# Patient Record
(demographics unavailable — no encounter records)

---

## 2024-11-04 NOTE — HISTORY OF PRESENT ILLNESS
[FreeTextEntry1] : Ms. Leon is a 55 year-old woman presenting for follow-up of primary hyperparathyroidism and menopausal syndrome. I saw her for an initial visit in February 2023 and last in April 2024.  Primary hyperparathyroidism. She has a history of hypercalcemia due to primary hyperparathyroidism and is status post parathyroidectomy in 2011 with Dr. Fantasma Yadav.  She has had subsequent hyperparathyroidism with serum calcium values at the upper end of normal.  Thyroid ultrasound from March 2023 demonstrated a right subcentimeter nodule not meeting [employer] Thyroid Imaging Reporting and Data Systems (TIRADS) criteria for biopsy or monitoring. No history of kidney stone. 24 hour urine calcium within range and renal ultrasound without nephrolithiasis in January 2023. She has a history of left elbow fracture around 2018 in a fall on ice. Bone density testing in November 2022 significant for T-scores of -1.0 at the lumbar spine, -1.4 at the femoral neck, -0.9 at the total hip and +0.6 at the distal radius. No history of radiation exposure. No history of hydrochlorothiazide or lithium use. She has 1/4 cup milk daily with coffee and occasional cheese or yogurt. She is taking Centrum Silver for Women 50+ (calcium 300 mg, vitamin D 1000 intl units) every other day. She is taking vitamin D 4000 intl units daily. Genetic testing in February 2023 negative for the parathyroid panel (AP2S1, CASR, CDC73, CDKN1B, GNA11, MEN1, RET).  Mother with possible recent history of hyperparathyroidism. No family history of endocrine tumors. No parental history of hip fracture.   Menopausal syndrome.  She has a history of surgical menopause from a hysterectomy in March 2019. She was started on oral estradiol 0.5 mg daily postoperatively with continued hot flashes; she had been titrated to 1 mg daily with improvement in symptoms. In February 2023 we changed oral estradiol to transdermal estradiol 0.05 mg twice weekly. She is currently taking transdermal estradiol 0.05 mg twice weekly.  No personal history of thromboembolism, breast cancer, or heart disease. No family history of breast cancer or premature heart disease.  Interim History  She is currently taking transdermal 0.05 mg twice weekly. Her symptoms of menopause have been under good control other than some mood disturbance.  Laboratory results from last visit as below. PTH elevated with serum calcium at the upper limit of normal as per previous. HbA1c, lipid panel, TSH, and other test results within range.  She has not been able to have bone density testing as discussed last visit.  She has had some right calf pain with negative evaluation for thrombosis. No other acute issues. Medical and surgical history, medications, allergies, social and family history reviewed and updated as needed.

## 2024-11-04 NOTE — ASSESSMENT
[FreeTextEntry1] : Primary hyperparathyroidism. She has a history of hypercalcemia due to primary hyperparathyroidism and is status post parathyroidectomy in 2011 with Dr. Fantasma Yadav. She has had subsequent hyperparathyroidism with serum calcium values at the upper end of normal. Genetic testing in February 2023 negative for the parathyroid panel (AP2S1, CASR, CDC73, CDKN1B, GNA11, MEN1, RET). We have discussed the complications of primary hyperparathyroidism, including but not limited to hypercalcemia, nephrolithiasis, and osteoporosis. We have discussed the recommendations for calcium and vitamin D intake; there is no indication to restrict calcium intake in patients with primary hyperparathyroidism. We can consider an additional parathyroid surgery if serum calcium rises above the upper limit of normal.  Monitor calciotropic panel Interval bone density testing Calcium 1000 mg daily from diet and supplements (to be taken in divided doses as no more than 500-600 mg can be absorbed at one time) Continue current vitamin D regimen pending level  Menopausal syndrome. We have discussed the risks and benefits of hormone replacement therapy, including but not limited to breast cancer, thromboembolic disease, heart attack, and stroke. She is tolerating her current regimen and we will continue. Continue transdermal estradiol 0.05 mg twice weekly Annual mammogram testing through gynecology; I referred her to gynecology providers within the [facility] system  Thyroid nodules. She has been biochemically euthyroid. Thyroid ultrasound from March 2023 demonstrated a right subcentimeter nodule not meeting criteria for biopsy or monitoring.   Calf pain. I referred her to sports medicine providers within the [facility] system.   Return to see me in 6 months or earlier as needed.

## 2024-11-04 NOTE — DATA REVIEWED
[FreeTextEntry1] : Thyroid ultrasound (March 10, 2023) reviewed and significant for: ISTHMUS: Normal size in AP dimension measuring 0.2 cm.  RIGHT LOBE:  Measures 4.6 x 1.9 x 1.4 cm in sagittal x AP x transverse dimensions.  Volume 6.4 cc. ARCHITECTURE: Slightly heterogeneous. There is a 0.6 x 0.3 x 0.3 cm well-circumscribed solid hypoechoic nodule in the midpole. COLOR DOPPLER:  Unremarkable.   LEFT LOBE:    Measures 3.4 x 1.1 x 1.6 cm in sagittal x AP x transverse dimensions. Volume 3.2 cc. ARCHITECTURE: Slightly heterogeneous without a discrete nodule. COLOR DOPPLER:  Unremarkable.   IMPRESSION: Subcentimeter hypoechoic nodule in the right midpole.

## 2024-11-04 NOTE — ADDENDUM
[FreeTextEntry1] : Recent laboratory results as below. Serum calcium at the upper end of normal with an elevated PTH concentration, consistent with known primary hyperparathyroidism. Glucose not fasting. TSH and other test results within range. I left a telephone message for Ms. Leon to discuss. 11/04/24

## 2024-11-04 NOTE — PHYSICAL EXAM
[Alert] : alert [Healthy Appearance] : healthy appearance [No Acute Distress] : no acute distress [Normal Sclera/Conjunctiva] : normal sclera/conjunctiva [Normal Hearing] : hearing was normal [Well Healed Scar] : well healed scar [No Respiratory Distress] : no respiratory distress [No Stigmata of Cushings Syndrome] : no stigmata of Cushings Syndrome [Normal Gait] : normal gait [Normal Insight/Judgement] : insight and judgment were intact [Kyphosis] : no kyphosis present [Acanthosis Nigricans] : no acanthosis nigricans [de-identified] : no moon facies, no supraclavicular fat pads

## 2024-11-04 NOTE — PHYSICAL EXAM
[Alert] : alert [Healthy Appearance] : healthy appearance [No Acute Distress] : no acute distress [Normal Sclera/Conjunctiva] : normal sclera/conjunctiva [Normal Hearing] : hearing was normal [Well Healed Scar] : well healed scar [No Respiratory Distress] : no respiratory distress [No Stigmata of Cushings Syndrome] : no stigmata of Cushings Syndrome [Normal Gait] : normal gait [Normal Insight/Judgement] : insight and judgment were intact [Kyphosis] : no kyphosis present [Acanthosis Nigricans] : no acanthosis nigricans [de-identified] : no moon facies, no supraclavicular fat pads

## 2024-11-04 NOTE — ASSESSMENT
[FreeTextEntry1] : Primary hyperparathyroidism. She has a history of hypercalcemia due to primary hyperparathyroidism and is status post parathyroidectomy in 2011 with Dr. Fantasma Yadav. She has had subsequent hyperparathyroidism with serum calcium values at the upper end of normal. Genetic testing in February 2023 negative for the parathyroid panel (AP2S1, CASR, CDC73, CDKN1B, GNA11, MEN1, RET). We have discussed the complications of primary hyperparathyroidism, including but not limited to hypercalcemia, nephrolithiasis, and osteoporosis. We have discussed the recommendations for calcium and vitamin D intake; there is no indication to restrict calcium intake in patients with primary hyperparathyroidism. We can consider an additional parathyroid surgery if serum calcium rises above the upper limit of normal.  Monitor calciotropic panel Interval bone density testing Calcium 1000 mg daily from diet and supplements (to be taken in divided doses as no more than 500-600 mg can be absorbed at one time) Continue current vitamin D regimen pending level  Menopausal syndrome. We have discussed the risks and benefits of hormone replacement therapy, including but not limited to breast cancer, thromboembolic disease, heart attack, and stroke. She is tolerating her current regimen and we will continue. Continue transdermal estradiol 0.05 mg twice weekly Annual mammogram testing through gynecology; I referred her to gynecology providers within the [facility] system  Thyroid nodules. She has been biochemically euthyroid. Thyroid ultrasound from March 2023 demonstrated a right subcentimeter nodule not meeting criteria for biopsy or monitoring.   Calf pain. I referred her to sports medicine providers within the [facility] system.   Return to see me in 6 months or earlier as needed.

## 2024-11-04 NOTE — HISTORY OF PRESENT ILLNESS
[FreeTextEntry1] : Ms. Leon is a 55 year-old woman presenting for follow-up of primary hyperparathyroidism and menopausal syndrome. I saw her for an initial visit in February 2023 and last in April 2024.  Primary hyperparathyroidism. She has a history of hypercalcemia due to primary hyperparathyroidism and is status post parathyroidectomy in 2011 with Dr. Fantasma Yadav.  She has had subsequent hyperparathyroidism with serum calcium values at the upper end of normal.  Thyroid ultrasound from March 2023 demonstrated a right subcentimeter nodule not meeting [employer] Thyroid Imaging Reporting and Data Systems (TIRADS) criteria for biopsy or monitoring. No history of kidney stone. 24 hour urine calcium within range and renal ultrasound without nephrolithiasis in January 2023. She has a history of left elbow fracture around 2018 in a fall on ice. Bone density testing in November 2022 significant for T-scores of -1.0 at the lumbar spine, -1.4 at the femoral neck, -0.9 at the total hip and +0.6 at the distal radius. No history of radiation exposure. No history of hydrochlorothiazide or lithium use. She has 1/4 cup milk daily with coffee and occasional cheese or yogurt. She is taking Centrum Silver for Women 50+ (calcium 300 mg, vitamin D 1000 intl units) every other day. She is taking vitamin D 4000 intl units daily. Genetic testing in February 2023 negative for the parathyroid panel (AP2S1, CASR, CDC73, CDKN1B, GNA11, MEN1, RET).  Mother with possible recent history of hyperparathyroidism. No family history of endocrine tumors. No parental history of hip fracture.   Menopausal syndrome.  She has a history of surgical menopause from a hysterectomy in March 2019. She was started on oral estradiol 0.5 mg daily postoperatively with continued hot flashes; she had been titrated to 1 mg daily with improvement in symptoms. In February 2023 we changed oral estradiol to transdermal estradiol 0.05 mg twice weekly. She is currently taking transdermal estradiol 0.05 mg twice weekly.  No personal history of thromboembolism, breast cancer, or heart disease. No family history of breast cancer or premature heart disease.  Interim History  She is currently taking transdermal 0.05 mg twice weekly. Her symptoms of menopause have been under good control other than some mood disturbance.  Laboratory results from last visit as below. PTH elevated with serum calcium at the upper limit of normal as per previous. HbA1c, lipid panel, TSH, and other test results within range.  She has not been able to have bone density testing as discussed last visit.  She has had some right calf pain with negative evaluation for thrombosis. No other acute issues. Medical and surgical history, medications, allergies, social and family history reviewed and updated as needed.  Declines

## 2025-02-19 NOTE — PHYSICAL EXAM
[No Acute Distress] : no acute distress [Well-Appearing] : well-appearing [Normal] : affect was normal and insight and judgment were intact [de-identified] : RLE varicose veins, no edema

## 2025-02-19 NOTE — HEALTH RISK ASSESSMENT
[Former] : Former [PHQ-2 Negative - No further assessment needed] : PHQ-2 Negative - No further assessment needed [[id number]] : 0

## 2025-02-19 NOTE — HISTORY OF PRESENT ILLNESS
[FreeTextEntry8] : Pt is a 54 y/o F who presents to the office today for leg pain  RLE pain: - since September 2024 - achy and heavy sensation of RLE near calf and medial aspect of leg - pt thinks it is due to varicose veins - sees bulging in the legs - in December 2024 went to the ED in [place] to r/o DVT.  US was negative in ED  breast calcifications: - needs biopsy - no pain, no skin or nipple changes  Pt requesting A1C and eval for fatty liver.

## 2025-03-14 NOTE — HISTORY OF PRESENT ILLNESS
[FreeTextEntry1] : The patient is a 55-year-old female with a history of symptomatic right lower leg varicose veins for many months.  Patient has no previous history of varicose vein surgery.  Patient did undergo cosmetic sclerotherapy several times over the past 10 years, the most recent of which was about 4 years ago.  Patient states that she has pain and aching in the right medial lower leg whenever she stands or sits for any extended period of time.  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  She is here now for evaluation.  Patient has not worn compression stockings regularly.  Patient currently takes no medications except estradiol.  She is on vitamin supplements for an element of hyperparathyroidism.    Patient had previous parathyroidectomy as well as previous hysterectomy.

## 2025-03-14 NOTE — ASSESSMENT
[FreeTextEntry1] : The patient is a 55-year-old female with a history of symptomatic right lower leg varicose veins for many months.  Patient has no previous history of varicose vein surgery.  Patient did undergo cosmetic sclerotherapy several times over the past 10 years, the most recent of which was about 4 years ago.  Patient states that she has pain and aching in the right medial lower leg whenever she stands or sits for any extended period of time.  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  She is here now for evaluation.  Patient has not worn compression stockings regularly.  Patient currently takes no medications except estradiol.  She is on vitamin supplements for an element of hyperparathyroidism.    Patient had previous parathyroidectomy as well as previous hysterectomy.  Right leg venous duplex scan with no evidence of deep or superficial venous thrombosis.  Great and small saphenous veins without reflux.    There is significant reflux and tributary veins in the right medial lower leg  Impression -symptomatic right leg varicose veins and extensive telangiectasias. Recommend -phlebectomy of symptomatic varicose veins and right medial lower leg.  Also recommend 20 to 30 mm knee-high compression stockings which were prescribed today. Risks and benefits and alternative treatments of phlebectomy including bleeding were discussed with the patient today. Patient will also consider cosmetic sclerotherapy in the future for bilateral lower extremity telangiectasias. [Arterial/Venous Disease] : arterial/venous disease

## 2025-03-14 NOTE — PHYSICAL EXAM
[JVD] : no jugular venous distention  [Ankle Swelling On The Right] : mild [Varicose Veins Of The Right Leg] : of the right leg [] : of the right leg [Ankle Swelling On The Left] : moderate [No Rash or Lesion] : No rash or lesion [Alert] : alert [Oriented to Person] : oriented to person [Oriented to Place] : oriented to place [Oriented to Time] : oriented to time [Calm] : calm [de-identified] : Well-developed female in no distress [de-identified] : Normocephalic [FreeTextEntry1] : Visible tender varicose veins right medial lower leg Extensive telangiectasias upper lower legs bilaterally

## 2025-03-14 NOTE — PROCEDURE
[FreeTextEntry1] : Right leg venous duplex scan with no evidence of deep or superficial venous thrombosis.  Great and small saphenous veins without reflux.    There is significant reflux and tributary veins in the right medial lower leg

## 2025-04-18 NOTE — PROCEDURE
[FreeTextEntry1] : right leg microphlebectomy [FreeTextEntry2] : symptomatic right leg varicose veins [FreeTextEntry3] :    Procedure - stab phlebectomy - 10-20 stab phlebectomies   Limb Confirmation -right   Pre op Diagnosis - symptomatic varicose veins Post Op Diagnosis -   symptomatic varicose veins   Anesthesia - local 1% plain Lidocaine)   The patient is a 55 year old female with a history of right leg symptomatic varicose veins.  Duplex scan shows venous valvular insufficiency of the right leg tributary veins.  The patient is now brought to the procedure room for microphlebectomy.  Risks and benefits and alternative treatments were discussed with the patient prior to the procedure.   Varicose tributary veins were marked with the patient in the upright position.  The patient was then brought to the procedure room and placed in the supine position.  A timeout was performed.  The    leg was prepped and draped in usual sterile fashion.   Phlebectomy was then performed of the previously marked tributary veins. Using the stab avulsion technique, multiple stab incisions were made, and the varicose tributary veins were removed.  Direct pressure was held for hemostasis and Steri-Strips and nylon sutures were placed to close the puncture sites.   Dry sterile dressing was applied followed by compression bandage. The patient tolerated the procedure well.

## 2025-04-25 NOTE — ASSESSMENT
[Arterial/Venous Disease] : arterial/venous disease [FreeTextEntry1] : Pt is one week s/p right leg stab phlebectomy.  Doing well.  No complaints. Minimal bruising at puncture sites. No pain.  sutures removed right leg  Patient with excellent result. no residual varicose veins.  Previous symptoms of aching and heaviness resolved. Follow up prn.  No restrictions on activity

## 2025-04-25 NOTE — HISTORY OF PRESENT ILLNESS
[FreeTextEntry1] : Pt is one week s/p right leg stab phlebectomy.  Doing well.  No complaints. Minimal bruising at puncture sites. No pain.

## 2025-04-25 NOTE — PHYSICAL EXAM
[No Rash or Lesion] : No rash or lesion [Alert] : alert [Oriented to Place] : oriented to place [Oriented to Time] : oriented to time [Calm] : calm [Ankle Swelling (On Exam)] : not present [Varicose Veins Of Lower Extremities] : not present [] : not present [Oriented to Person] : disoriented to person [de-identified] : well developed female no distress [de-identified] : normocephalic [FreeTextEntry1] : Well healed puncture sited right medial lower leg, mild ecchymoses [de-identified] : full rom